# Patient Record
Sex: FEMALE | Employment: FULL TIME | URBAN - NONMETROPOLITAN AREA
[De-identification: names, ages, dates, MRNs, and addresses within clinical notes are randomized per-mention and may not be internally consistent; named-entity substitution may affect disease eponyms.]

---

## 2023-12-16 LAB — PROLACTIN: 14.7 NG/ML (ref 4.79–23.3)

## 2024-04-22 ENCOUNTER — OFFICE VISIT (OUTPATIENT)
Dept: FAMILY MEDICINE CLINIC | Age: 34
End: 2024-04-22
Payer: COMMERCIAL

## 2024-04-22 VITALS
OXYGEN SATURATION: 96 % | WEIGHT: 262 LBS | SYSTOLIC BLOOD PRESSURE: 124 MMHG | RESPIRATION RATE: 20 BRPM | BODY MASS INDEX: 39.71 KG/M2 | HEIGHT: 68 IN | DIASTOLIC BLOOD PRESSURE: 84 MMHG | HEART RATE: 92 BPM

## 2024-04-22 DIAGNOSIS — D12.6 TUBULAR ADENOMA OF COLON: ICD-10-CM

## 2024-04-22 DIAGNOSIS — K21.9 GASTROESOPHAGEAL REFLUX DISEASE WITHOUT ESOPHAGITIS: ICD-10-CM

## 2024-04-22 DIAGNOSIS — K76.0 NAFL (NONALCOHOLIC FATTY LIVER): Primary | ICD-10-CM

## 2024-04-22 DIAGNOSIS — R00.2 HEART PALPITATIONS: ICD-10-CM

## 2024-04-22 PROBLEM — R19.7 DIARRHEA: Status: ACTIVE | Noted: 2024-04-22

## 2024-04-22 PROBLEM — U07.1 DISEASE DUE TO SEVERE ACUTE RESPIRATORY SYNDROME CORONAVIRUS 2 (SARS-COV-2): Status: ACTIVE | Noted: 2024-04-22

## 2024-04-22 PROBLEM — J02.9 ACUTE PHARYNGITIS: Status: ACTIVE | Noted: 2024-04-22

## 2024-04-22 PROBLEM — O03.9 SPONTANEOUS ABORTION: Status: ACTIVE | Noted: 2024-04-22

## 2024-04-22 PROBLEM — E86.0 DEHYDRATION: Status: ACTIVE | Noted: 2024-04-22

## 2024-04-22 PROCEDURE — 99204 OFFICE O/P NEW MOD 45 MIN: CPT | Performed by: NURSE PRACTITIONER

## 2024-04-22 SDOH — ECONOMIC STABILITY: FOOD INSECURITY: WITHIN THE PAST 12 MONTHS, YOU WORRIED THAT YOUR FOOD WOULD RUN OUT BEFORE YOU GOT MONEY TO BUY MORE.: NEVER TRUE

## 2024-04-22 SDOH — ECONOMIC STABILITY: INCOME INSECURITY: HOW HARD IS IT FOR YOU TO PAY FOR THE VERY BASICS LIKE FOOD, HOUSING, MEDICAL CARE, AND HEATING?: NOT HARD AT ALL

## 2024-04-22 SDOH — ECONOMIC STABILITY: FOOD INSECURITY: WITHIN THE PAST 12 MONTHS, THE FOOD YOU BOUGHT JUST DIDN'T LAST AND YOU DIDN'T HAVE MONEY TO GET MORE.: NEVER TRUE

## 2024-04-22 SDOH — ECONOMIC STABILITY: HOUSING INSECURITY
IN THE LAST 12 MONTHS, WAS THERE A TIME WHEN YOU DID NOT HAVE A STEADY PLACE TO SLEEP OR SLEPT IN A SHELTER (INCLUDING NOW)?: NO

## 2024-04-22 ASSESSMENT — PATIENT HEALTH QUESTIONNAIRE - PHQ9
SUM OF ALL RESPONSES TO PHQ QUESTIONS 1-9: 0
1. LITTLE INTEREST OR PLEASURE IN DOING THINGS: NOT AT ALL
SUM OF ALL RESPONSES TO PHQ QUESTIONS 1-9: 0
2. FEELING DOWN, DEPRESSED OR HOPELESS: NOT AT ALL
SUM OF ALL RESPONSES TO PHQ QUESTIONS 1-9: 0
SUM OF ALL RESPONSES TO PHQ QUESTIONS 1-9: 0
SUM OF ALL RESPONSES TO PHQ9 QUESTIONS 1 & 2: 0

## 2024-04-22 ASSESSMENT — ENCOUNTER SYMPTOMS
SHORTNESS OF BREATH: 0
DIARRHEA: 1

## 2024-04-22 NOTE — PROGRESS NOTES
[facility] A department [address] [address]  Phone: [phone]  Fax: [phone]    Tahira Steele (:  1990) is a 34 y.o. female,New patient, here for evaluation of the following chief complaint(s):  New Patient (Discuss fatty liver diease, gerd taking OTC med) and Palpitations (Has heart palpitation when pregnant, had to wear heart monitor. )      Assessment and Plan     1. NAFL (nonalcoholic fatty liver)  Chronic issue. Not currently under the care with gastroenterology. Will refer to Dr. Martinez.  - CBC with Auto Differential; Future  - Comprehensive Metabolic Panel; Future  - Lipid, Fasting; Future  - AFL - Adalid Martinez MD, Gastroenterology, [place]    2. Gastroesophageal reflux disease without esophagitis  Discussed use of PPI versus H2 blockers and advised on calcium absorption issues with long term use of PPI.  Would recommend GI input. Given diet education for managing GERD in [place]  - AFL - Adalid Martinez MD, Gastroenterology, [place]    3. Tubular adenoma of colon  Benign findings. Recommended to have colonoscopy every 5 years going forward    4. Heart palpitations  Will check current TSH with reflex T4. Offered cardiology referral if they become worse. May benefit by BB treatment in the future. Also offered event recorder but states that she has done this in the past also and was not able to capture events.  - TSH With Reflex Ft4; Future      No significant needs today. Encouraged to follow up when needed.   Return in about 3 months (around 2024) for or sooner as needed..      Discussed exam, POCT findings, plan of care, and follow-up at length with patient and/or their caregiver. Reviewed all prescribed and recommended medications, administration and side effects. All questions were addressed and answered with verbalization of understanding. The patient and/or the caregiver was agreeable with the plan.

## 2024-05-22 PROBLEM — E86.0 DEHYDRATION: Status: RESOLVED | Noted: 2024-04-22 | Resolved: 2024-05-22

## 2024-07-29 ENCOUNTER — OFFICE VISIT (OUTPATIENT)
Dept: FAMILY MEDICINE CLINIC | Age: 34
End: 2024-07-29
Payer: COMMERCIAL

## 2024-07-29 VITALS
HEIGHT: 68 IN | BODY MASS INDEX: 40.01 KG/M2 | WEIGHT: 264 LBS | HEART RATE: 85 BPM | RESPIRATION RATE: 18 BRPM | DIASTOLIC BLOOD PRESSURE: 86 MMHG | OXYGEN SATURATION: 97 % | SYSTOLIC BLOOD PRESSURE: 124 MMHG

## 2024-07-29 DIAGNOSIS — Z71.3 ENCOUNTER FOR WEIGHT LOSS COUNSELING: ICD-10-CM

## 2024-07-29 DIAGNOSIS — K11.20 PAROTID SIALADENITIS: Primary | ICD-10-CM

## 2024-07-29 PROCEDURE — 99213 OFFICE O/P EST LOW 20 MIN: CPT | Performed by: NURSE PRACTITIONER

## 2024-07-29 RX ORDER — BUPROPION HYDROCHLORIDE 150 MG/1
150 TABLET ORAL EVERY MORNING
Qty: 30 TABLET | Refills: 3 | Status: SHIPPED | OUTPATIENT
Start: 2024-07-29

## 2024-07-29 RX ORDER — AMOXICILLIN AND CLAVULANATE POTASSIUM 875; 125 MG/1; MG/1
1 TABLET, FILM COATED ORAL 2 TIMES DAILY
Qty: 20 TABLET | Refills: 0 | Status: SHIPPED | OUTPATIENT
Start: 2024-07-29 | End: 2024-08-08

## 2024-07-29 RX ORDER — OMEPRAZOLE 20 MG/1
20 CAPSULE, DELAYED RELEASE ORAL DAILY
COMMUNITY

## 2024-07-29 ASSESSMENT — ENCOUNTER SYMPTOMS
RESPIRATORY NEGATIVE: 1
FACIAL SWELLING: 1
TROUBLE SWALLOWING: 0

## 2024-07-29 NOTE — PROGRESS NOTES
prescribed and recommended medications, administration and side effects. All questions were addressed and answered with verbalization of understanding. The patient and/or the caregiver was agreeable with the plan.   Subjective:   Patient is here for routine 3 month follow up. Since last visit saw the GI specialist and had multiple labs done with good results. Also met with GYN provider but did not have GYN exam. Reminded her that she is over due for cervical cancer screening. She reports that she has not been able to loose weight and is frustrated. Would like help. Also has been having discomfort  in the area in front of her right ear and Jaw pain for about 2 weeks. She Is worried as her mother had a tumor in a salivary gland with similar symptoms in the past.     Review of Systems   Constitutional:  Negative for appetite change, fatigue and fever.   HENT:  Positive for ear pain (pressure) and facial swelling. Negative for trouble swallowing. Dental problem: jaw discomfort also.   Respiratory: Negative.     Cardiovascular: Negative.    Genitourinary: Negative.    Musculoskeletal: Negative.    Skin:  Negative for rash.   Psychiatric/Behavioral: Negative.     All other systems reviewed and are negative.      Past Medical History:   Past Medical History:   Diagnosis Date    Fatty liver     GERD (gastroesophageal reflux disease)     Pancreatitis         Past Surgical History:  has a past surgical history that includes Esophagogastroduodenoscopy (10/27/2023); Colonoscopy (10/27/2023); Dilation and curettage of uterus; and Cholecystectomy (2008).     Allergies:   Allergies   Allergen Reactions    Grapefruit Extract Other (See Comments)     Made lips numb    Kiwi Extract Other (See Comments)     Makes lips bleed       Social History:  reports that she has quit smoking. Her smoking use included cigarettes. She started smoking about 15 years ago. She has never used smokeless tobacco. She reports current drug use. Drug:

## 2024-07-29 NOTE — PATIENT INSTRUCTIONS
Consider weight loss program such as Noom or GoLo.    Google research Bariatric diet.  Start reading food labels and learning how to use food scale for portion size.   Keep track of daily food intake.   Drink at least 64 ounces of water daily.

## 2024-11-23 LAB — GYNECOLOGY CYTOLOGY REPORT: NORMAL

## 2024-12-05 LAB — PROGESTERONE LEVEL: 2.36 NG/ML

## 2025-01-21 LAB — HCG QUANTITATIVE: 59 MUNIT/ML (ref 0–5)

## 2025-01-23 LAB — HCG QUANTITATIVE: 156.2 MUNIT/ML (ref 0–5)

## 2025-01-27 LAB — HCG QUANTITATIVE: 1426.5 MUNIT/ML (ref 0–5)

## 2025-03-06 LAB
BASOPHILS ABSOLUTE: 0.1 X10E3/?L (ref 0–0.3)
BASOPHILS RELATIVE PERCENT: 0.71 % (ref 0–3)
EOSINOPHILS ABSOLUTE: 0.21 X10E3/?L (ref 0–1.1)
EOSINOPHILS RELATIVE PERCENT: 1.59 % (ref 0–10)
HCT VFR BLD CALC: 35.7 % (ref 37–47)
HEMOGLOBIN: 11.5 G/DL (ref 12–16)
LYMPHOCYTES ABSOLUTE: 3.19 X10E3/?L (ref 1–5.5)
LYMPHOCYTES RELATIVE PERCENT: 23.76 % (ref 20–51.1)
MCH RBC QN AUTO: 26.7 PG (ref 28.5–32.5)
MCHC RBC AUTO-ENTMCNC: 32.1 G/DL (ref 32–37)
MCV RBC AUTO: 83.2 FL (ref 80–94)
MONOCYTES ABSOLUTE: 0.77 X10E3/?L (ref 0.1–1)
MONOCYTES RELATIVE PERCENT: 5.71 % (ref 1.7–9.3)
NEUTROPHILS ABSOLUTE: 9.16 X10E3/?L (ref 2–8.1)
NEUTROPHILS RELATIVE PERCENT: 68.23 % (ref 42.2–75.2)
PDW BLD-RTO: 12.7 % (ref 8.5–15.5)
PLATELET # BLD: 336.8 THOU/MM3 (ref 130–400)
RBC # BLD: 4.29 M/UL (ref 4.2–5.4)
WBC # BLD: 13.4 THOU/ML3 (ref 4.8–10.8)

## 2025-03-09 LAB
HEP B S AGB SURF AG: NONREACTIVE
HEPATITIS C ANTIBODY: NONREACTIVE
HIV AG/AB: NONREACTIVE
RPR TITER: NON REACTIVE
RUBELLA ANTIBODY IGG: 138 IU/ML

## 2025-03-26 LAB
AMPHETAMINE SCREEN URINE: NEGATIVE
BACTERIA, URINE: ABNORMAL /HPF
BARBITURATE SCREEN URINE: NEGATIVE
BENZODIAZEPINE SCREEN, URINE: NEGATIVE
BILIRUBIN, URINE: NEGATIVE
BLOOD, URINE: NEGATIVE
CANNABINOID SCREEN URINE: NEGATIVE
CASTS UA: ABNORMAL /LPF
CLARITY, UA: CLEAR
COCAINE METABOLITE SCREEN URINE: NEGATIVE
COLOR, UA: YELLOW
CRYSTALS, UA: ABNORMAL
GLUCOSE URINE: NEGATIVE MG/DL
KETONES, URINE: NEGATIVE MG/DL
LEUKOCYTE ESTERASE, URINE: NEGATIVE
MDMA, URINE: NEGATIVE
MUCUS, URINE: ABNORMAL
NITRITE, URINE: NEGATIVE
OPIATE SCREEN URINE: NEGATIVE
OXYCODONE SCREEN URINE: NEGATIVE
PH, URINE: 6 (ref 5–8.5)
PHENCYCLIDINE SCREEN URINE: NEGATIVE
PROTEIN UA: NEGATIVE MG/DL
RBC URINE: ABNORMAL /HPF (ref 0–2)
SPECIFIC GRAVITY UA: <=1.005 E.U./DL (ref 1–1.03)
SQUAMOUS EPITHELIAL: ABNORMAL /HPF
TRICYCLIC ANTIDEPRESSANTS, UR: NEGATIVE
UROBILINOGEN, URINE: 0.2 MG/DL (ref 0.2–1)
WBC URINE: ABNORMAL /HPF (ref 0–4)

## 2025-03-27 LAB
CHLAMYDIA TRACHOMATIS DNA, URINE: NEGATIVE
N. GONORRHOEAE DNA, URINE: NEGATIVE

## 2025-04-24 LAB
AFP MOM: 1.49
AFP SPECIMEN: NORMAL
CURRENTLY SMOKING: NO
DATE OF LMP: NORMAL
DATING: NORMAL
DETERMINED BY: NORMAL
DIABETIC: NO
DONOR EGG?: NORMAL
DUE DATE: NORMAL
ESTIMATED DUE DATE: NORMAL
FAMILY HISTORY: NO
GESTATIONAL AGE: NORMAL
IN VITRO FERTILIZATION: NO
INSULIN DEP. DIABETIC: NO
MATERNAL AGE AT EDD: 33.6
MATERNAL DOB: NORMAL
MATERNAL SCREEN INTERPRETATION: NORMAL
MATERNAL WEIGHT: NORMAL
MONOCHORIONIC TWINS: NORMAL
NUMBER OF FETUSES: NORMAL
PATIENT WEIGHT UNITS: NORMAL
PT AFP: 34
RACE (MATERNAL): NORMAL
RACE/ETHNICITY: NORMAL
REPEAT SPECIMEN?: NO
SMOKER?: NO
VALPROIC/CARBAMAZEP: NO

## 2025-08-25 LAB
ALBUMIN/GLOBULIN RATIO: 1.13 G/DL
ALBUMIN: 3.4 G/DL (ref 3.5–5)
ALP BLD-CCNC: 113 UNITS/L (ref 38–126)
ALT SERPL-CCNC: 21 UNITS/L (ref 4–35)
ANION GAP SERPL CALCULATED.3IONS-SCNC: 12.1 MMOL/L (ref 3–11)
AST SERPL-CCNC: 23 UNITS/L (ref 14–36)
BACTERIA, URINE: NORMAL /HPF
BASOPHILS ABSOLUTE: 0.09 X10E3/?L (ref 0–0.3)
BASOPHILS RELATIVE PERCENT: 0.63 % (ref 0–3)
BILIRUB SERPL-MCNC: 0.2 MG/DL (ref 0.2–1.3)
BILIRUBIN, URINE: NEGATIVE
BLOOD, URINE: NEGATIVE
BUN BLDV-MCNC: 11 MG/DL (ref 7–17)
CALCIUM SERPL-MCNC: 9.8 MG/DL (ref 8.4–10.2)
CASTS UA: NORMAL /LPF
CHLORIDE BLD-SCNC: 105 MMOL/L (ref 98–120)
CLARITY, UA: CLEAR
CO2: 21 MMOL/L (ref 22–31)
COLOR, UA: YELLOW
CREAT SERPL-MCNC: 0.6 MG/DL (ref 0.5–1)
CREATININE, RANDOM URINE: 126.5 MG/DL (ref 20–370)
CRYSTALS, UA: NORMAL
EOSINOPHILS ABSOLUTE: 0.13 X10E3/?L (ref 0–1.1)
EOSINOPHILS RELATIVE PERCENT: 0.91 % (ref 0–10)
GFR, ESTIMATED: > 60
GLOBULIN: 3 G/DL
GLUCOSE URINE: NEGATIVE MG/DL
GLUCOSE: 114 MG/DL (ref 65–105)
HCT VFR BLD CALC: 38.7 % (ref 37–47)
HEMOGLOBIN: 11.9 G/DL (ref 12–16)
KETONES, URINE: NEGATIVE MG/DL
LACTATE DEHYDROGENASE: 160 UNITS/L (ref 120–246)
LEUKOCYTE ESTERASE, URINE: NEGATIVE
LYMPHOCYTES ABSOLUTE: 2.11 X10E3/?L (ref 1–5.5)
LYMPHOCYTES RELATIVE PERCENT: 15.22 % (ref 20–51.1)
MCH RBC QN AUTO: 25.8 PG (ref 28.5–32.5)
MCHC RBC AUTO-ENTMCNC: 30.7 G/DL (ref 32–37)
MCV RBC AUTO: 84.2 FL (ref 80–94)
MONOCYTES ABSOLUTE: 0.61 X10E3/?L (ref 0.1–1)
MONOCYTES RELATIVE PERCENT: 4.43 % (ref 1.7–9.3)
MUCUS, URINE: NORMAL
NEUTROPHILS ABSOLUTE: 10.94 X10E3/?L (ref 2–8.1)
NEUTROPHILS RELATIVE PERCENT: 78.8 % (ref 42.2–75.2)
NITRITE, URINE: NEGATIVE
PDW BLD-RTO: 15.2 % (ref 8.5–15.5)
PH, URINE: 6.5 (ref 5–8.5)
PLATELET # BLD: 341.3 THOU/MM3 (ref 130–400)
POTASSIUM SERPL-SCNC: 4.1 MMOL/L (ref 3.6–5)
PROTEIN UA: NEGATIVE MG/DL
PROTEIN, URINE: < 5 MG/DL (ref 0–12)
PROTEIN/CREAT RATIO URINE RAN: 0.03 (ref 0–2)
RBC # BLD: 4.6 M/UL (ref 4.2–5.4)
RBC URINE: NORMAL /HPF (ref 0–2)
SODIUM BLD-SCNC: 134 MMOL/L (ref 135–145)
SPECIFIC GRAVITY UA: 1.02 E.U./DL (ref 1–1.03)
SQUAMOUS EPITHELIAL: NORMAL /HPF
TOTAL PROTEIN: 6.4 G/DL (ref 6.3–8.2)
UROBILINOGEN, URINE: 0.2 MG/DL (ref 0.2–1)
WBC # BLD: 13.9 THOU/ML3 (ref 4.8–10.8)
WBC URINE: NORMAL /HPF (ref 0–4)

## 2025-09-04 LAB
ALBUMIN/GLOBULIN RATIO: 1 G/DL
ALBUMIN: 3.5 G/DL (ref 3.5–5)
ALP BLD-CCNC: 112 UNITS/L (ref 38–126)
ALT SERPL-CCNC: 22 UNITS/L (ref 4–35)
ANION GAP SERPL CALCULATED.3IONS-SCNC: 5.2 MMOL/L (ref 3–11)
AST SERPL-CCNC: 25 UNITS/L (ref 14–36)
BASOPHILS ABSOLUTE: 0.19 X10E3/?L (ref 0–0.3)
BASOPHILS RELATIVE PERCENT: 1.47 % (ref 0–3)
BILIRUB SERPL-MCNC: 0.3 MG/DL (ref 0.2–1.3)
BUN BLDV-MCNC: 12 MG/DL (ref 7–17)
CALCIUM SERPL-MCNC: 9.4 MG/DL (ref 8.4–10.2)
CHLORIDE BLD-SCNC: 107 MMOL/L (ref 98–120)
CO2: 22 MMOL/L (ref 22–31)
CREAT SERPL-MCNC: 0.6 MG/DL (ref 0.5–1)
EOSINOPHILS ABSOLUTE: 0.14 X10E3/?L (ref 0–1.1)
EOSINOPHILS RELATIVE PERCENT: 1.06 % (ref 0–10)
GFR, ESTIMATED: > 60
GLOBULIN: 3.5 G/DL
GLUCOSE: 97 MG/DL (ref 65–105)
HCT VFR BLD CALC: 38.2 % (ref 37–47)
HEMOGLOBIN: 11.7 G/DL (ref 12–16)
LACTATE DEHYDROGENASE: 152 UNITS/L (ref 120–246)
LYMPHOCYTES ABSOLUTE: 1.51 X10E3/?L (ref 1–5.5)
LYMPHOCYTES RELATIVE PERCENT: 11.66 % (ref 20–51.1)
MCH RBC QN AUTO: 26 PG (ref 28.5–32.5)
MCHC RBC AUTO-ENTMCNC: 30.7 G/DL (ref 32–37)
MCV RBC AUTO: 84.5 FL (ref 80–94)
MONOCYTES ABSOLUTE: 0.88 X10E3/?L (ref 0.1–1)
MONOCYTES RELATIVE PERCENT: 6.83 % (ref 1.7–9.3)
NEUTROPHILS ABSOLUTE: 10.23 X10E3/?L (ref 2–8.1)
NEUTROPHILS RELATIVE PERCENT: 78.99 % (ref 42.2–75.2)
PDW BLD-RTO: 15.4 % (ref 8.5–15.5)
PLATELET # BLD: 321.1 THOU/MM3 (ref 130–400)
POTASSIUM SERPL-SCNC: 3.9 MMOL/L (ref 3.6–5)
RBC # BLD: 4.52 M/UL (ref 4.2–5.4)
SODIUM BLD-SCNC: 135 MMOL/L (ref 135–145)
TOTAL PROTEIN: 7 G/DL (ref 6.3–8.2)
WBC # BLD: 13 THOU/ML3 (ref 4.8–10.8)